# Patient Record
Sex: MALE | URBAN - METROPOLITAN AREA
[De-identification: names, ages, dates, MRNs, and addresses within clinical notes are randomized per-mention and may not be internally consistent; named-entity substitution may affect disease eponyms.]

---

## 2024-09-12 DIAGNOSIS — R59.0 CERVICAL LYMPHADENOPATHY: Primary | ICD-10-CM

## 2024-09-23 ENCOUNTER — TELEPHONE (OUTPATIENT)
Dept: SURGERY | Facility: CLINIC | Age: 24
End: 2024-09-23

## 2024-09-23 NOTE — TELEPHONE ENCOUNTER
----- Message from Derrek Norwood sent at 9/23/2024  1:24 PM CDT -----  Contact: [id number]  Type:  Needs Medical Advice    Who Called:Ton   Symptoms (please be specific): appt/ referral    Would the patient rather a call back or a response via [facility]? Call back   Best Call Back Number: [phone]  Additional Information: pt is needing appt scheduled from referral    Pt was contacted to schedule appt pt was left a vm to call back

## 2024-09-23 NOTE — TELEPHONE ENCOUNTER
----- Message from Nichelle Mckeon sent at 9/23/2024  2:13 PM CDT -----  Contact: self  Type:  Patient Returning Call    Who Called:Ton Rogers  Who Left Message for Patient:Jennifer  Does the patient know what this is regarding?:scheduling  Would the patient rather a call back or a response via [facility]? Call back  Best Call Back Number:[phone]  Additional Information: n/a    Pt was contacted and stated he will be able to come on 11/18 @ 100